# Patient Record
Sex: MALE | Race: WHITE | NOT HISPANIC OR LATINO | URBAN - METROPOLITAN AREA
[De-identification: names, ages, dates, MRNs, and addresses within clinical notes are randomized per-mention and may not be internally consistent; named-entity substitution may affect disease eponyms.]

---

## 2024-10-21 ENCOUNTER — EMERGENCY (EMERGENCY)
Facility: HOSPITAL | Age: 39
LOS: 0 days | Discharge: HOME | End: 2024-10-21
Attending: STUDENT IN AN ORGANIZED HEALTH CARE EDUCATION/TRAINING PROGRAM
Payer: MEDICAID

## 2024-10-21 VITALS
OXYGEN SATURATION: 97 % | TEMPERATURE: 99 F | DIASTOLIC BLOOD PRESSURE: 86 MMHG | WEIGHT: 178.35 LBS | HEART RATE: 102 BPM | RESPIRATION RATE: 18 BRPM | SYSTOLIC BLOOD PRESSURE: 126 MMHG

## 2024-10-21 VITALS
HEART RATE: 79 BPM | TEMPERATURE: 98 F | DIASTOLIC BLOOD PRESSURE: 86 MMHG | OXYGEN SATURATION: 94 % | SYSTOLIC BLOOD PRESSURE: 129 MMHG | RESPIRATION RATE: 16 BRPM

## 2024-10-21 DIAGNOSIS — R07.0 PAIN IN THROAT: ICD-10-CM

## 2024-10-21 DIAGNOSIS — J03.90 ACUTE TONSILLITIS, UNSPECIFIED: ICD-10-CM

## 2024-10-21 DIAGNOSIS — Z91.012 ALLERGY TO EGGS: ICD-10-CM

## 2024-10-21 PROBLEM — Z78.9 OTHER SPECIFIED HEALTH STATUS: Chronic | Status: ACTIVE | Noted: 2022-03-23

## 2024-10-21 LAB
ANION GAP SERPL CALC-SCNC: 10 MMOL/L — SIGNIFICANT CHANGE UP (ref 5–17)
BUN SERPL-MCNC: 19 MG/DL — SIGNIFICANT CHANGE UP (ref 7–23)
CALCIUM SERPL-MCNC: 10.5 MG/DL — HIGH (ref 8.5–10.1)
CHLORIDE SERPL-SCNC: 100 MMOL/L — SIGNIFICANT CHANGE UP (ref 96–108)
CO2 SERPL-SCNC: 25 MMOL/L — SIGNIFICANT CHANGE UP (ref 22–31)
CREAT SERPL-MCNC: 0.89 MG/DL — SIGNIFICANT CHANGE UP (ref 0.5–1.3)
EGFR: 111 ML/MIN/1.73M2 — SIGNIFICANT CHANGE UP
GLUCOSE SERPL-MCNC: 112 MG/DL — HIGH (ref 70–99)
HCT VFR BLD CALC: 41.5 % — SIGNIFICANT CHANGE UP (ref 39–50)
HGB BLD-MCNC: 14.5 G/DL — SIGNIFICANT CHANGE UP (ref 13–17)
MCHC RBC-ENTMCNC: 30.2 PG — SIGNIFICANT CHANGE UP (ref 27–34)
MCHC RBC-ENTMCNC: 34.9 GM/DL — SIGNIFICANT CHANGE UP (ref 32–36)
MCV RBC AUTO: 86.5 FL — SIGNIFICANT CHANGE UP (ref 80–100)
PLATELET # BLD AUTO: 222 K/UL — SIGNIFICANT CHANGE UP (ref 150–400)
POTASSIUM SERPL-MCNC: 3.2 MMOL/L — LOW (ref 3.5–5.3)
POTASSIUM SERPL-SCNC: 3.2 MMOL/L — LOW (ref 3.5–5.3)
RBC # BLD: 4.8 M/UL — SIGNIFICANT CHANGE UP (ref 4.2–5.8)
RBC # FLD: 12.8 % — SIGNIFICANT CHANGE UP (ref 10.3–14.5)
SODIUM SERPL-SCNC: 135 MMOL/L — SIGNIFICANT CHANGE UP (ref 135–145)
WBC # BLD: 11.28 K/UL — HIGH (ref 3.8–10.5)
WBC # FLD AUTO: 11.28 K/UL — HIGH (ref 3.8–10.5)

## 2024-10-21 PROCEDURE — 42700 I&D ABSCESS PERITONSILLAR: CPT

## 2024-10-21 PROCEDURE — 36415 COLL VENOUS BLD VENIPUNCTURE: CPT

## 2024-10-21 PROCEDURE — 99285 EMERGENCY DEPT VISIT HI MDM: CPT

## 2024-10-21 PROCEDURE — 70491 CT SOFT TISSUE NECK W/DYE: CPT | Mod: 26,MC

## 2024-10-21 PROCEDURE — 99284 EMERGENCY DEPT VISIT MOD MDM: CPT | Mod: 25

## 2024-10-21 PROCEDURE — 96375 TX/PRO/DX INJ NEW DRUG ADDON: CPT | Mod: XU

## 2024-10-21 PROCEDURE — 80048 BASIC METABOLIC PNL TOTAL CA: CPT

## 2024-10-21 PROCEDURE — 96374 THER/PROPH/DIAG INJ IV PUSH: CPT | Mod: XU

## 2024-10-21 PROCEDURE — 85027 COMPLETE CBC AUTOMATED: CPT

## 2024-10-21 PROCEDURE — 70491 CT SOFT TISSUE NECK W/DYE: CPT | Mod: MC

## 2024-10-21 RX ORDER — CEFTRIAXONE SODIUM 1 G
2000 VIAL (EA) INJECTION ONCE
Refills: 0 | Status: COMPLETED | OUTPATIENT
Start: 2024-10-21 | End: 2024-10-21

## 2024-10-21 RX ORDER — CLINDAMYCIN PHOSPHATE 150 MG/ML
1 INJECTION, SOLUTION INTRAVENOUS
Qty: 40 | Refills: 0
Start: 2024-10-21 | End: 2024-10-30

## 2024-10-21 RX ORDER — CLINDAMYCIN PHOSPHATE 150 MG/ML
600 INJECTION, SOLUTION INTRAVENOUS ONCE
Refills: 0 | Status: COMPLETED | OUTPATIENT
Start: 2024-10-21 | End: 2024-10-21

## 2024-10-21 RX ADMIN — CLINDAMYCIN PHOSPHATE 100 MILLIGRAM(S): 150 INJECTION, SOLUTION INTRAVENOUS at 05:51

## 2024-10-21 RX ADMIN — Medication 2000 MILLIGRAM(S): at 05:51

## 2024-10-21 NOTE — ED ADULT NURSE NOTE - SUICIDE SCREENING DEPRESSION
Multiples calls to transport companies. [facility] No ALS until after 4am, [employer]- No answer, [facility]- No ALS, [facility]- No BiPAP, [address] [facility] are busy and No BiPAP, [facility]- No Medic, [facility]- No Answer, [facility]- No Crews, [address]- No Answer. Called [employer] back and set up for 0500.       Benton Olsen  02/23/20 8267 Negative

## 2024-10-21 NOTE — ED PROVIDER NOTE - OBJECTIVE STATEMENT
38-year-old male with no past medical history presents to the ER complaining of throat pain.  As per patient and wife the pain began yesterday.  Now worsening.  Has taken 2 ibuprofen with no relief.  States that it is painful to swallow and painful to speak.  Denies difficulty breathing, cough, chest pain.  Denies fever, chills.

## 2024-10-21 NOTE — ED ADULT NURSE NOTE - OBJECTIVE STATEMENT
38yM AOx4 ambulatory, presents to  ED c/o 7/10 throat pain starting yesterday. pt took advil PTA without relief. pt endorses L ear pain and a headache. pt denies difficulty briefing, SOB. respirations even and unlabored. pt denies fever/chills, N/V/D, chest pain. no sick contacts at home. family member at bedside.

## 2024-10-21 NOTE — ED ADULT NURSE NOTE - NSFALLUNIVINTERV_ED_ALL_ED
Bed/Stretcher in lowest position, wheels locked, appropriate side rails in place/Call bell, personal items and telephone in reach/Instruct patient to call for assistance before getting out of bed/chair/stretcher/Non-slip footwear applied when patient is off stretcher/[place] to call system/Physically safe environment - no spills, clutter or unnecessary equipment/Purposeful proactive rounding/Room/bathroom lighting operational, light cord in reach

## 2024-10-21 NOTE — ED ADULT TRIAGE NOTE - CHIEF COMPLAINT QUOTE
Pt c/o throat pain, difficulty swallowing and speaking, L ear pain, and HA since yesterday. Denies CP, SOB. Pt reports taking Ibuprofen PTA.

## 2024-10-21 NOTE — ED PROVIDER NOTE - CARE PROVIDERS DIRECT ADDRESSES
[email or web address]

## 2024-10-21 NOTE — ED PROVIDER NOTE - CARE PROVIDER_API CALL
Fredrick Jones.  Otolaryngology  [address] [place] [address], [address]  [place] [address]  Phone: [phone]  Fax: [phone]  Follow Up Time:     Nguyễn Posey  Otolaryngology  [address] [facility], [address]  [place] [address]  Phone: [phone]  Fax: [phone]  Follow Up Time:     Darek Galvan.  Otolaryngology  [address] [facility], [address] [place] [id number]  Phone: [phone]  Fax: [phone]  Follow Up Time:

## 2024-10-21 NOTE — ED PROVIDER NOTE - PROGRESS NOTE DETAILS
Ct showing tonsilitis with peritonsillar abscess on Left. Attempted needle aspiration but unable to express exudates. Patient with follow closely with ENT and call office today. Given clindamycin rx. No reps distress or airway compromise. Ambulating independently in ED. Tolerating PO. Stable for dc. Patient verbalizes understanding of return precautions and f/u.

## 2024-10-21 NOTE — ED PROVIDER NOTE - PHYSICAL EXAMINATION
Const: Well appearing, NAD  Eyes: PERRL, EOM intact  ENT: TMs clear b/l. Nares clear b/l. Left tonsillar edema. No tenderness to floor of mouth, Uvula midline  CV: RRR, no murmurs, no chest wall tenderness, distal pulses intact  Resp: CTAB, normal resp effort  GI: soft, nondistended, nontender. No CVA tenderness  MSK: Full ROM, no muscle or bony deformity or tenderness  Neuro: AOx3, GCS 15, No focal deficits  Skin: No rash, laceration or abrasion  Psych: calm, cooperative.

## 2024-10-21 NOTE — ED PROVIDER NOTE - PROVIDER TOKENS
PROVIDER:[TOKEN:[[id number]]],PROVIDER:[TOKEN:[[id number]]],PROVIDER:[TOKEN:[[id number]]]

## 2024-10-21 NOTE — ED PROVIDER NOTE - CLINICAL SUMMARY MEDICAL DECISION MAKING FREE TEXT BOX
Patient presents to the ER with throat pain.  No respiratory distress or airway compromise.  Has large left tonsillar edema.  Patient vitally stable.  CT scan, lab work ordered.  Decadron, antibiotics ordered.

## 2024-10-21 NOTE — ED PROVIDER NOTE - NSFOLLOWUPINSTRUCTIONS_ED_ALL_ED_FT
Absceso periamigdalino    LO QUE NECESITA SABER:    Un absceso periamigdalino (APA) es la acumulación de pus en el espacio periamigdalino. El espacio periamigdalino es la kofi entre las amígdalas y la pared posterior de daniel garganta. Lea se encuentra cerca de la apertura de los conductos que van hacia el estómago y los pulmones.    INSTRUCCIONES SOBRE EL ROSMERY HOSPITALARIA:    Llame al número de emergencias local (911 en los [place]) si:    Usted tiene dificultad para respirar.    Busque atención médica de inmediato si:    Usted tiene el diane rígido.    Está sangrando en la garganta.    Usted tiene más dolor, inflamación o enrojecimiento en daniel garganta.    Tiene más problemas para abrir la boca o dolor al tragar.    No puede comer o beber a causa de lo síntomas.  Llame a daniel médico si:    Tiene fiebre nuevamente o empeora.    El absceso se vuelve a formar.    Usted tiene preguntas o inquietudes acerca de daniel condición o cuidado.  Medicamentos:Es posible que usted necesite alguno de los siguientes:    Los antibióticosayudan a tratar o prevenir infecciones bacterianas.    Acetaminofénalivia el dolor y baja la fiebre. Está disponible sin receta médica. Pregunte la cantidad y la frecuencia con que debe tomarlos. Siga las indicaciones. Trice las etiquetas de todos los demás medicamentos que esté usando para saber si también contienen acetaminofén, o pregunte a daniel médico o farmacéutico. El acetaminofén puede causar daño en el hígado cuando no se melany de forma correcta.    Los esteroidesdisminuyen la inflamación.    AINEcomo el ibuprofeno, ayudan a disminuir la inflamación, el dolor y la fiebre. Lea medicamento está disponible con o sin abdias receta médica. Los LOUISE pueden causar sangrado estomacal o problemas renales en ciertas personas. Si usted está tomando un anticoagulante, siempre pregunte si los LOUISE son seguros para usted. Siempre trice la etiqueta de lea medicamento y siga las instrucciones. No administre lea medicamento a niños menores de 6 meses de adrianne sin antes obtener la autorización del médico.    Orono jayne medicamentos violet se le haya indicado.Consulte con daniel médico si usted obey que daniel medicamento no le está ayudando o si presenta efectos secundarios. Infórmele al médico si usted es alérgico a algún medicamento. Mantenga abdias lista actualizada de los medicamentos, las vitaminas y los productos herbales que melany. Incluya los siguientes datos de los medicamentos: cantidad, frecuencia y motivo de administración. Traiga con usted la lista o los envases de las píldoras a jayne citas de seguimiento. Lleve la lista de los medicamentos con usted en chelsy de abdias emergencia.  Controle o evite un APA:    Coma alimentos que hilton fáciles de tragar.Daniel médico podría recomendar abdias dieta blanda. Abdias dieta blanda incluye alimentos húmedos que pueden estar picados, triturados o hechos puré.    Orono líquidos violet se le haya indicado.Intente hidratarse miguel angel todo el día. Los líquidos ayudarán a evitar la deshidratación.    Douglas gárgaras de agua con sal, si así lo indican.Mezcle ¼ de cucharadita de sal en un vaso con 8 onzas de agua tibia y douglas gárgaras. No lo trague. El agua salada podría ayudar a reducir la hinchazón de la garganta. Daniel médico le dirá con qué frecuencia hacerlo por día y miguel angel cuántos días.    Mantenga daniel boca limpia y saludable.Cepíllese los dientes o enjuáguese la boca después de comer y antes de acostarse a dormir. Cepíllese los dientes y las encías suavemente con un cepillo de dientes de cuerdas suaves. Vaya donde daniel dentista para revisiones regulares.  Acuda a la consulta de control con daniel médico según las indicaciones:Anote jayne preguntas así se acuerda de hacerlas miguel angel jayne visitas.    1) Douglas un seguimiento con daniel médico de atención primaria en los próximos 5 a 7 días.  Por favor llame mañana para abdias tatiana.  Si no puede realizar un seguimiento con daniel médico de atención primaria, regrese al servicio de urgencias si tiene algún problema urgente.  2) Se le entregó abdias copia de las pruebas realizadas hoy.  Traiga los resultados con usted y revíselos con daniel médico de atención primaria.  3) Si jayen síntomas empeoran o tiene alguna otra inquietud, regrese al servicio de urgencias de inmediato.  4) Continúe tomando jayne medicamentos caseros según las indicaciones.

## 2024-10-21 NOTE — ED PROVIDER NOTE - PATIENT PORTAL LINK FT
You can access the FollowMyHealth Patient Portal offered by [facility] by registering at the following website: [email or web address]. By joining [employer]’s FollowMyHealth portal, you will also be able to view your health information using other applications (apps) compatible with our system.

## 2024-10-23 ENCOUNTER — NON-APPOINTMENT (OUTPATIENT)
Age: 39
End: 2024-10-23

## 2024-10-23 ENCOUNTER — APPOINTMENT (OUTPATIENT)
Age: 39
End: 2024-10-23
Payer: MEDICAID

## 2024-10-23 VITALS — SYSTOLIC BLOOD PRESSURE: 116 MMHG | DIASTOLIC BLOOD PRESSURE: 72 MMHG | HEIGHT: 66 IN | HEART RATE: 71 BPM

## 2024-10-23 DIAGNOSIS — R13.12 DYSPHAGIA, OROPHARYNGEAL PHASE: ICD-10-CM

## 2024-10-23 DIAGNOSIS — Z78.9 OTHER SPECIFIED HEALTH STATUS: ICD-10-CM

## 2024-10-23 PROCEDURE — 99203 OFFICE O/P NEW LOW 30 MIN: CPT | Mod: 25

## 2024-10-23 PROCEDURE — 42700 I&D ABSCESS PERITONSILLAR: CPT | Mod: LT

## 2024-10-23 RX ORDER — IBUPROFEN 200 MG/1
CAPSULE, LIQUID FILLED ORAL
Refills: 0 | Status: ACTIVE | COMMUNITY

## 2024-10-23 RX ORDER — CLINDAMYCIN HYDROCHLORIDE 300 MG/1
300 CAPSULE ORAL
Refills: 0 | Status: ACTIVE | COMMUNITY

## 2024-10-30 ENCOUNTER — APPOINTMENT (OUTPATIENT)
Dept: OTOLARYNGOLOGY | Facility: CLINIC | Age: 39
End: 2024-10-30
Payer: MEDICAID

## 2024-10-30 VITALS — BODY MASS INDEX: 26.52 KG/M2 | WEIGHT: 165 LBS | HEIGHT: 66 IN

## 2024-10-30 DIAGNOSIS — J36 PERITONSILLAR ABSCESS: ICD-10-CM

## 2024-10-30 PROCEDURE — 99213 OFFICE O/P EST LOW 20 MIN: CPT | Mod: 24

## 2024-11-02 LAB — FUNGUS FLD CULT: NORMAL

## 2025-04-15 ENCOUNTER — APPOINTMENT (OUTPATIENT)
Dept: DERMATOLOGY | Facility: CLINIC | Age: 40
End: 2025-04-15
Payer: MEDICAID

## 2025-04-15 ENCOUNTER — NON-APPOINTMENT (OUTPATIENT)
Age: 40
End: 2025-04-15

## 2025-04-15 DIAGNOSIS — L71.9 ROSACEA, UNSPECIFIED: ICD-10-CM

## 2025-04-15 DIAGNOSIS — R21 RASH AND OTHER NONSPECIFIC SKIN ERUPTION: ICD-10-CM

## 2025-04-15 DIAGNOSIS — L21.9 SEBORRHEIC DERMATITIS, UNSPECIFIED: ICD-10-CM

## 2025-04-15 PROCEDURE — 99204 OFFICE O/P NEW MOD 45 MIN: CPT

## 2025-04-15 RX ORDER — TACROLIMUS 1 MG/G
0.1 OINTMENT TOPICAL
Qty: 1 | Refills: 1 | Status: ACTIVE | COMMUNITY
Start: 2025-04-15 | End: 1900-01-01

## 2025-04-15 RX ORDER — DOXYCYCLINE HYCLATE 100 MG/1
100 TABLET ORAL DAILY
Qty: 42 | Refills: 2 | Status: ACTIVE | COMMUNITY
Start: 2025-04-15 | End: 1900-01-01

## 2025-04-15 RX ORDER — KETOCONAZOLE 20 MG/ML
2 SHAMPOO TOPICAL
Qty: 2 | Refills: 10 | Status: ACTIVE | COMMUNITY
Start: 2025-04-15 | End: 1900-01-01